# Patient Record
Sex: MALE | Race: WHITE
[De-identification: names, ages, dates, MRNs, and addresses within clinical notes are randomized per-mention and may not be internally consistent; named-entity substitution may affect disease eponyms.]

---

## 2019-09-10 ENCOUNTER — HOSPITAL ENCOUNTER (OUTPATIENT)
Dept: HOSPITAL 74 - JASU-SURG | Age: 69
Discharge: HOME | End: 2019-09-10
Payer: COMMERCIAL

## 2019-12-10 ENCOUNTER — HOSPITAL ENCOUNTER (OUTPATIENT)
Dept: HOSPITAL 74 - JASU-SURG | Age: 69
Discharge: HOME | End: 2019-12-10
Attending: OPHTHALMOLOGY
Payer: COMMERCIAL

## 2019-12-10 VITALS — TEMPERATURE: 98.2 F

## 2019-12-10 VITALS — SYSTOLIC BLOOD PRESSURE: 136 MMHG | DIASTOLIC BLOOD PRESSURE: 71 MMHG | HEART RATE: 66 BPM

## 2019-12-10 VITALS — BODY MASS INDEX: 27.1 KG/M2

## 2019-12-10 DIAGNOSIS — H26.9: Primary | ICD-10-CM

## 2019-12-10 PROCEDURE — 08RK3JZ REPLACEMENT OF LEFT LENS WITH SYNTHETIC SUBSTITUTE, PERCUTANEOUS APPROACH: ICD-10-PCS | Performed by: OPHTHALMOLOGY

## 2019-12-16 NOTE — OP
DATE OF OPERATION:  12/10/2019

 

PREOPERATIVE DIAGNOSIS:  Cataract, left eye.

 

POSTOPERATIVE DIAGNOSIS:  Cataract, left eye.

 

OPERATION:  Planned phacoemulsification with posterior chamber lens implantation,

left eye.  

 

SURGEON:  Natali Melchor M.D.

 

ASSISTANT:  None.  

 

ANESTHESIA:  Topical.

 

COMPLICATIONS:  None.

 

Posterior chamber lens was SN60WF, 20.5 diopters.  No sutures placed.  

 

DISPOSITION:  Patient went to the ambulatory area in stable condition.  

 

PROCEDURE:  The patient was taken to the operating room and anesthesia began with

intravenous fluids and sedation.  The patient then received topical anesthesia on the

left eye.  The patient was prepped and draped in the usual manner for sterile

ophthalmic surgery.  A self-sealing stab incision was made at the 9:00 and 5:00

positions.

 

Viscoat was inserted into the anterior chamber.  Using a 2.65 mm keratome, the

anterior chamber was entered temporally.  A 360-degree continuous capsulorrhexis was

then performed.  The nucleus was dislocated with hydrodissection.  The nucleus was

then removed from the eye in an uncomplicated fashion with the posterior capsule

remaining intact.  Irrigation and aspiration removed the remaining cortex from the

eye.

 

A posterior chamber lens was inserted into the bag and well centered.  At this time,

the remaining Provisc and Viscoat were removed from the eye. 

 

The wound was checked multiple times and nicely self-sealing.  Stromal hydration was

performed with balanced salt solution. 

 

The patient completed the procedure in an uncomplicated fashion and went to the

ambulatory unit in stable condition.

 

 

NATALI MELCHOR M.D.

 

[id number]

DD: 12/16/2019 17:37

DT: 12/16/2019 20:03

Job #:  [id number]